# Patient Record
Sex: FEMALE | Race: WHITE | NOT HISPANIC OR LATINO | Employment: FULL TIME | URBAN - METROPOLITAN AREA
[De-identification: names, ages, dates, MRNs, and addresses within clinical notes are randomized per-mention and may not be internally consistent; named-entity substitution may affect disease eponyms.]

---

## 2019-01-21 ENCOUNTER — OFFICE VISIT (OUTPATIENT)
Dept: FAMILY MEDICINE CLINIC | Facility: CLINIC | Age: 69
End: 2019-01-21
Payer: COMMERCIAL

## 2019-01-21 VITALS
BODY MASS INDEX: 33.27 KG/M2 | HEIGHT: 61 IN | HEART RATE: 64 BPM | TEMPERATURE: 97.2 F | RESPIRATION RATE: 16 BRPM | SYSTOLIC BLOOD PRESSURE: 130 MMHG | DIASTOLIC BLOOD PRESSURE: 88 MMHG | WEIGHT: 176.2 LBS

## 2019-01-21 DIAGNOSIS — Z00.00 HEALTHCARE MAINTENANCE: ICD-10-CM

## 2019-01-21 DIAGNOSIS — R07.89 CHEST TIGHTNESS: ICD-10-CM

## 2019-01-21 DIAGNOSIS — R73.03 PREDIABETES: ICD-10-CM

## 2019-01-21 DIAGNOSIS — G89.29 CHRONIC BILATERAL THORACIC BACK PAIN: ICD-10-CM

## 2019-01-21 DIAGNOSIS — Z12.39 BREAST CANCER SCREENING: ICD-10-CM

## 2019-01-21 DIAGNOSIS — R06.02 SHORTNESS OF BREATH ON EXERTION: ICD-10-CM

## 2019-01-21 DIAGNOSIS — M54.6 CHRONIC BILATERAL THORACIC BACK PAIN: ICD-10-CM

## 2019-01-21 DIAGNOSIS — Z76.89 ENCOUNTER TO ESTABLISH CARE: Primary | ICD-10-CM

## 2019-01-21 DIAGNOSIS — Z13.220 LIPID SCREENING: ICD-10-CM

## 2019-01-21 DIAGNOSIS — R05.9 COUGH: ICD-10-CM

## 2019-01-21 LAB
SL AMB  POCT GLUCOSE, UA: NORMAL
SL AMB LEUKOCYTE ESTERASE,UA: NORMAL
SL AMB POCT BILIRUBIN,UA: NORMAL
SL AMB POCT BLOOD,UA: NORMAL
SL AMB POCT CLARITY,UA: CLEAR
SL AMB POCT COLOR,UA: YELLOW
SL AMB POCT KETONES,UA: NORMAL
SL AMB POCT NITRITE,UA: NORMAL
SL AMB POCT PH,UA: 7.5
SL AMB POCT SPECIFIC GRAVITY,UA: 1
SL AMB POCT URINE PROTEIN: NORMAL
SL AMB POCT UROBILINOGEN: 0.2

## 2019-01-21 PROCEDURE — 1160F RVW MEDS BY RX/DR IN RCRD: CPT | Performed by: NURSE PRACTITIONER

## 2019-01-21 PROCEDURE — 81002 URINALYSIS NONAUTO W/O SCOPE: CPT | Performed by: NURSE PRACTITIONER

## 2019-01-21 PROCEDURE — 3008F BODY MASS INDEX DOCD: CPT | Performed by: NURSE PRACTITIONER

## 2019-01-21 PROCEDURE — 1101F PT FALLS ASSESS-DOCD LE1/YR: CPT | Performed by: NURSE PRACTITIONER

## 2019-01-21 PROCEDURE — 99204 OFFICE O/P NEW MOD 45 MIN: CPT | Performed by: NURSE PRACTITIONER

## 2019-01-21 NOTE — PROGRESS NOTES
FAMILY PRACTICE OFFICE VISIT       NAME: Karla Ford  AGE: 80 y o  SEX: female       : 1950        MRN: [id number]    DATE: 2019    Assessment and Plan     Problem List Items Addressed This Visit     Prediabetes    Relevant Orders    Hemoglobin A1C      Other Visit Diagnoses     Encounter to establish care    -  Primary    Relevant Orders    POCT urine dip auto non-scope (Completed)    Shortness of breath on exertion        Relevant Orders    CBC and differential    Comprehensive metabolic panel    TSH, 3rd generation    Echo stress test w contrast if indicated    XR chest pa & lateral    Echo complete with contrast if indicated    Cough        Relevant Orders    CBC and differential    Comprehensive metabolic panel    TSH, 3rd generation    Echo stress test w contrast if indicated    XR chest pa & lateral    Chest tightness        Relevant Orders    CBC and differential    Comprehensive metabolic panel    TSH, 3rd generation    Echo stress test w contrast if indicated    XR chest pa & lateral    Chronic bilateral thoracic back pain        Lipid screening        Relevant Orders    Lipid panel    Breast cancer screening        Relevant Orders    Mammo screening bilateral w cad    Healthcare maintenance                1  Encounter to establish care  This 71-year-old female presents today with stables care  She has recently moved from [place]  She will request records from her prior PCP  In office urine dip is clear  POCT urine dip auto non-scope   2  Shortness of breath on exertion  Over the past 2 months has had shortness of breath with exertion, occasional cough, and chest tightness  Denies any other associated symptoms  No shortness of breath at rest  Symptoms began with a cold in which all other symptoms resolved  Exam is unremarkable  Etiology is unclear  Recommend blood work as noted, chest x-ray, echocardiogram, and echo stress testing   Office will call with result of testing when available, and further plan of care  If any worsening of symptoms, instructed to call or go to the emergency room  CBC and differential    Comprehensive metabolic panel    TSH, 3rd generation    Echo stress test w contrast if indicated    XR chest pa & lateral    Echo complete with contrast if indicated   3  Cough  CBC and differential    Comprehensive metabolic panel    TSH, 3rd generation    Echo stress test w contrast if indicated    XR chest pa & lateral   4  Chest tightness  CBC and differential    Comprehensive metabolic panel    TSH, 3rd generation    Echo stress test w contrast if indicated    XR chest pa & lateral   5  Chronic Bilateral Thoracic Back Pain Pain has been present since heavy lifting this past summer while volunteering at a [employer]  Pain is present at night, improves in the morning when she gets up and gets moving again  Suspect pain is muscular  Declines PT  Declines trial of muscle relaxer or NSAID  Declines chiropractor  Will trial heat and continue stretching  If symptoms continue to persist, she will call and we will consider imaging  6  Lipid screening  Lipid panel   7  Breast cancer screening  Mammo screening bilateral w cad   8  Prediabetes Hemoglobin A1C   9  Health Maintenance Per patient report:  Last mammogram 2017  Colonoscopy 2012 or 2013, needs repeat in 10 years  Last Dexa showed osteopenia, completed around 2014  Up to date on influenza vaccine, had pneumonia vaccines and zostavax  Will request records from prior PCP in [place], to get definitive dates and results of health maintenance activities  Chief Complaint     Chief Complaint   Patient presents with    Cold Like Symptoms     NP is here to est care, cough and SOB 1 + mos       History of Present Illness     Jazzy David is a 80year old female presenting today to establish care  She has recently moved from [place]   She is living with her sister locally, and working in [place] for the [employer]  Plans to work for 3 more years locally, then retire to [place], as she still has family there  Health has been overall good  Denies and significant past medical history  Past surgical history includes low back surgery, right hip replacement, cholecystectomy, and knee arthroscopic surgery for torn meniscus  BP elevated today, states she has never had hypertension  Typically, BP runs 120's/70's  In November she started with a chest cold, which seemed to resolve  But, since then she has short of breath with exertion and chest tightness  She has an occasional cough, cough has improved significantly over the past month  Shortness of breath has improved a little bit, but still bothersome  Never had asthma  Never smoked  No chest pain, dizziness, or palpitations  No paroxysmal nocturnal dyspnea, or orthopnea  She is having intermittent pain between shoulder blades for the past 4-5 months  She was doing a lot of heavy lifting volunteering for a [employer] over the summer  Pain has been present since then  Pain is present when lying on side or back  Sore after resting, and initiating movements, but improves with activity  No pain at rest  No numbness, tingling, or weakness in bilateral upper extremities  Health Maintenance:  Last mammogram 2017  Colonoscopy 2012 or 2013, needs repeat in 10 years  Last Dexa showed osteopenia, completed around 2014  Up to date on influenza vaccine, had pneumonia vaccines and zostavax  Review of Systems   Review of Systems   Constitutional: Negative for chills, diaphoresis, fatigue and fever  HENT: Negative  Eyes: Negative  Respiratory: Positive for cough and shortness of breath  Negative for wheezing  Cardiovascular: Negative for chest pain, palpitations and leg swelling  Gastrointestinal: Negative  Endocrine: Negative  Genitourinary: Negative      Musculoskeletal: Positive for back pain  Arthralgias: As noted in HPI  Skin: Negative  Allergic/Immunologic: Negative  Neurological: Negative  Hematological: Negative  Psychiatric/Behavioral: Negative  Active Problem List     Patient Active Problem List   Diagnosis    Osteopenia    Prediabetes       Past Medical History:  Past Medical History:   Diagnosis Date    Piriformis syndrome        Past Surgical History:  Past Surgical History:   Procedure Laterality Date    BACK SURGERY      CHOLECYSTECTOMY      KNEE SURGERY      torn meniscus    TOTAL HIP ARTHROPLASTY Right        Family History:  Family History   Problem Relation Age of Onset    Stroke Mother     COPD Mother     COPD Father     Diabetes Father     Asthma Brother     Factor V Leiden deficiency Other         neice and nephew       Social History:  Social History     Social History    Marital status:      Spouse name: N/A    Number of children: N/A    Years of education: N/A     Occupational History    Not on file  Social History Main Topics    Smoking status: Never Smoker    Smokeless tobacco: Never Used    Alcohol use 4 2 oz/week     7 Glasses of wine per week      Comment: drinks 4-6 ounces of wine daily    Drug use: No    Sexual activity: Not on file     Other Topics Concern    Not on file     Social History Narrative    No narrative on file       I have reviewed the patient's medical history in detail; there are no changes to the history as noted in the electronic medical record  Objective     Vitals:    01/21/19 1138 01/21/19 1243   BP: 150/90 130/88   Pulse: 64    Resp: 16    Temp: (!) 97 2 °F (36 2 °C)    TempSrc: Tympanic    Weight: 79 9 kg (176 lb 3 2 oz)    Height: 5' 0 91" (1 547 m)      Wt Readings from Last 3 Encounters:   01/21/19 79 9 kg (176 lb 3 2 oz)     Body mass index is 33 4 kg/m²    PHQ-9 Depression Screening    PHQ-9:    Frequency of the following problems over the past two weeks:       Little interest or pleasure in doing things:  0 - not at all  Feeling down, depressed, or hopeless:  0 - not at all  PHQ-2 Score:  0       Physical Exam   Constitutional: She is oriented to person, place, and time  She appears well-developed and well-nourished  No distress  HENT:   Head: Normocephalic and atraumatic  Right Ear: Tympanic membrane and ear canal normal    Left Ear: Tympanic membrane and ear canal normal    Nose: Nose normal    Mouth/Throat: Oropharynx is clear and moist    Eyes: Pupils are equal, round, and reactive to light  Conjunctivae and EOM are normal    Neck: Normal range of motion  Neck supple  No thyromegaly present  Negative for carotid bruits bilaterally  Cardiovascular: Normal rate and regular rhythm  No murmur heard  Pulmonary/Chest: Effort normal and breath sounds normal    Abdominal: Soft  Bowel sounds are normal    Musculoskeletal: Normal range of motion  Arms:  Lymphadenopathy:     She has no cervical adenopathy  Neurological: She is alert and oriented to person, place, and time  Skin: No rash noted  Psychiatric: She has a normal mood and affect  Nursing note and vitals reviewed  ALLERGIES:  No Known Allergies    Current Medications     No current outpatient prescriptions on file  No current facility-administered medications for this visit            Health Maintenance     Health Maintenance   Topic Date Due    DTaP,Tdap,and Td Vaccines (1 - Tdap) 07/17/1972    Pneumococcal PPSV23/PCV13 65+ Years / Low and Medium Risk (1 of 2 - PCV13) 07/17/2016    Hepatitis C Screening  04/18/2019 (Originally 1950)    CRC Screening: Colonoscopy  01/21/2023 (Originally 1950)    Fall Risk  01/21/2020    Depression Screening PHQ  01/21/2020    Urinary Incontinence Screening  01/21/2020    INFLUENZA VACCINE  Completed     Immunization History   Administered Date(s) Administered    Influenza 10/19/2018       BRISEYDA Toscano

## 2019-01-22 PROBLEM — M85.80 OSTEOPENIA: Status: ACTIVE | Noted: 2019-01-22

## 2019-01-22 PROBLEM — R73.03 PREDIABETES: Status: ACTIVE | Noted: 2019-01-22

## 2019-01-26 ENCOUNTER — APPOINTMENT (OUTPATIENT)
Dept: RADIOLOGY | Facility: CLINIC | Age: 69
End: 2019-01-26
Payer: COMMERCIAL

## 2019-01-26 DIAGNOSIS — R07.89 CHEST TIGHTNESS: ICD-10-CM

## 2019-01-26 DIAGNOSIS — R06.02 SHORTNESS OF BREATH ON EXERTION: ICD-10-CM

## 2019-01-26 DIAGNOSIS — R05.9 COUGH: ICD-10-CM

## 2019-01-26 PROCEDURE — 71046 X-RAY EXAM CHEST 2 VIEWS: CPT

## 2019-01-30 ENCOUNTER — TELEPHONE (OUTPATIENT)
Dept: FAMILY MEDICINE CLINIC | Facility: CLINIC | Age: 69
End: 2019-01-30

## 2019-01-30 ENCOUNTER — LAB (OUTPATIENT)
Dept: LAB | Facility: CLINIC | Age: 69
End: 2019-01-30
Payer: COMMERCIAL

## 2019-01-30 DIAGNOSIS — R05.9 COUGH: ICD-10-CM

## 2019-01-30 DIAGNOSIS — Z13.220 LIPID SCREENING: ICD-10-CM

## 2019-01-30 DIAGNOSIS — R06.02 SHORTNESS OF BREATH ON EXERTION: ICD-10-CM

## 2019-01-30 DIAGNOSIS — R79.89 ELEVATED TSH: Primary | ICD-10-CM

## 2019-01-30 DIAGNOSIS — R73.03 PREDIABETES: ICD-10-CM

## 2019-01-30 DIAGNOSIS — R07.89 CHEST TIGHTNESS: ICD-10-CM

## 2019-01-30 LAB
ALBUMIN SERPL BCP-MCNC: 3.4 G/DL (ref 3.5–5)
ALP SERPL-CCNC: 69 U/L (ref 46–116)
ALT SERPL W P-5'-P-CCNC: 14 U/L (ref 12–78)
ANION GAP SERPL CALCULATED.3IONS-SCNC: 5 MMOL/L (ref 4–13)
AST SERPL W P-5'-P-CCNC: 26 U/L (ref 5–45)
BASOPHILS # BLD AUTO: 0.05 THOUSANDS/ΜL (ref 0–0.1)
BASOPHILS NFR BLD AUTO: 1 % (ref 0–1)
BILIRUB SERPL-MCNC: 0.68 MG/DL (ref 0.2–1)
BUN SERPL-MCNC: 18 MG/DL (ref 5–25)
CALCIUM SERPL-MCNC: 8.6 MG/DL (ref 8.3–10.1)
CHLORIDE SERPL-SCNC: 106 MMOL/L (ref 100–108)
CHOLEST SERPL-MCNC: 203 MG/DL (ref 50–200)
CO2 SERPL-SCNC: 27 MMOL/L (ref 21–32)
CREAT SERPL-MCNC: 0.8 MG/DL (ref 0.6–1.3)
EOSINOPHIL # BLD AUTO: 0.13 THOUSAND/ΜL (ref 0–0.61)
EOSINOPHIL NFR BLD AUTO: 2 % (ref 0–6)
ERYTHROCYTE [DISTWIDTH] IN BLOOD BY AUTOMATED COUNT: 14.1 % (ref 11.6–15.1)
EST. AVERAGE GLUCOSE BLD GHB EST-MCNC: 126 MG/DL
GFR SERPL CREATININE-BSD FRML MDRD: 77 ML/MIN/1.73SQ M
GLUCOSE P FAST SERPL-MCNC: 98 MG/DL (ref 65–99)
HBA1C MFR BLD: 6 % (ref 4.2–6.3)
HCT VFR BLD AUTO: 41 % (ref 34.8–46.1)
HDLC SERPL-MCNC: 73 MG/DL (ref 40–60)
HGB BLD-MCNC: 12.8 G/DL (ref 11.5–15.4)
IMM GRANULOCYTES # BLD AUTO: 0.01 THOUSAND/UL (ref 0–0.2)
IMM GRANULOCYTES NFR BLD AUTO: 0 % (ref 0–2)
LDLC SERPL CALC-MCNC: 115 MG/DL (ref 0–100)
LYMPHOCYTES # BLD AUTO: 1.53 THOUSANDS/ΜL (ref 0.6–4.47)
LYMPHOCYTES NFR BLD AUTO: 27 % (ref 14–44)
MCH RBC QN AUTO: 28.5 PG (ref 26.8–34.3)
MCHC RBC AUTO-ENTMCNC: 31.2 G/DL (ref 31.4–37.4)
MCV RBC AUTO: 91 FL (ref 82–98)
MONOCYTES # BLD AUTO: 0.44 THOUSAND/ΜL (ref 0.17–1.22)
MONOCYTES NFR BLD AUTO: 8 % (ref 4–12)
NEUTROPHILS # BLD AUTO: 3.61 THOUSANDS/ΜL (ref 1.85–7.62)
NEUTS SEG NFR BLD AUTO: 62 % (ref 43–75)
NONHDLC SERPL-MCNC: 130 MG/DL
NRBC BLD AUTO-RTO: 0 /100 WBCS
PLATELET # BLD AUTO: 258 THOUSANDS/UL (ref 149–390)
PMV BLD AUTO: 10.5 FL (ref 8.9–12.7)
POTASSIUM SERPL-SCNC: 4.5 MMOL/L (ref 3.5–5.3)
PROT SERPL-MCNC: 7.1 G/DL (ref 6.4–8.2)
RBC # BLD AUTO: 4.49 MILLION/UL (ref 3.81–5.12)
SODIUM SERPL-SCNC: 138 MMOL/L (ref 136–145)
TRIGL SERPL-MCNC: 75 MG/DL
TSH SERPL DL<=0.05 MIU/L-ACNC: 6.51 UIU/ML (ref 0.36–3.74)
WBC # BLD AUTO: 5.77 THOUSAND/UL (ref 4.31–10.16)

## 2019-01-30 PROCEDURE — 36415 COLL VENOUS BLD VENIPUNCTURE: CPT

## 2019-01-30 PROCEDURE — 80053 COMPREHEN METABOLIC PANEL: CPT

## 2019-01-30 PROCEDURE — 85025 COMPLETE CBC W/AUTO DIFF WBC: CPT

## 2019-01-30 PROCEDURE — 80061 LIPID PANEL: CPT

## 2019-01-30 PROCEDURE — 83036 HEMOGLOBIN GLYCOSYLATED A1C: CPT

## 2019-01-30 PROCEDURE — 84443 ASSAY THYROID STIM HORMONE: CPT

## 2019-01-30 NOTE — PROGRESS NOTES
Please contact patient with blood work results  Liver, kidneys, electrolytes, blood sugar, and blood count are good  Cholesterol is mildly elevated, recommend eating low fat, low cholesterol diet  Consider a mediterranean diet  Thyroid function is a little bit underactive  Recommend repeating blood work for thyroid function in 1 month  Her x-ray results are still pending

## 2019-01-30 NOTE — TELEPHONE ENCOUNTER
----- Message from [id number] sent at 1/30/2019  1:02 PM EST -----  Please contact patient with blood work results  Liver, kidneys, electrolytes, blood sugar, and blood count are good  Cholesterol is mildly elevated, recommend eating low fat, low cholesterol diet  Consider a mediterranean diet  Thyroid function is a little bit underactive  Recommend repeating blood work for thyroid function in 1 month  Her x-ray results are still pending

## 2019-02-01 NOTE — PROGRESS NOTES
Please let patient know her chest x-ray is clear  No obvious reason for shortness of breath   Proceed with echocardiogram and stress test

## 2019-02-02 ENCOUNTER — TELEPHONE (OUTPATIENT)
Dept: FAMILY MEDICINE CLINIC | Facility: CLINIC | Age: 69
End: 2019-02-02

## 2019-02-02 NOTE — TELEPHONE ENCOUNTER
----- [id number] sent at 2/1/2019  4:31 PM EST -----  Please let patient know her chest x-ray is clear  No obvious reason for shortness of breath   Proceed with echocardiogram and stress test

## 2019-02-02 NOTE — TELEPHONE ENCOUNTER
----- [id number] at 2/1/2019  4:31 PM EST -----  Please let patient know her chest x-ray is clear  No obvious reason for shortness of breath   Proceed with echocardiogram and stress test

## 2019-03-01 ENCOUNTER — HOSPITAL ENCOUNTER (OUTPATIENT)
Dept: NON INVASIVE DIAGNOSTICS | Facility: HOSPITAL | Age: 69
Discharge: HOME | End: 2019-03-01
Payer: COMMERCIAL

## 2019-03-01 DIAGNOSIS — R06.02 SHORTNESS OF BREATH ON EXERTION: ICD-10-CM

## 2019-03-01 DIAGNOSIS — R07.89 CHEST TIGHTNESS: ICD-10-CM

## 2019-03-01 DIAGNOSIS — R05.9 COUGH: ICD-10-CM

## 2019-03-01 PROCEDURE — 93350 STRESS TTE ONLY: CPT

## 2019-03-01 PROCEDURE — 93306 TTE W/DOPPLER COMPLETE: CPT | Performed by: INTERNAL MEDICINE

## 2019-03-01 PROCEDURE — 93306 TTE W/DOPPLER COMPLETE: CPT

## 2019-03-01 PROCEDURE — 93351 STRESS TTE COMPLETE: CPT | Performed by: INTERNAL MEDICINE

## 2019-03-04 LAB
CHEST PAIN STATEMENT: NORMAL
MAX DIASTOLIC BP: 80 MMHG
MAX HEART RATE: 157 BPM
MAX PREDICTED HEART RATE: 153 BPM
MAX. SYSTOLIC BP: 170 MMHG
PROTOCOL NAME: NORMAL
TARGET HR FORMULA: NORMAL
TEST INDICATION: NORMAL
TIME IN EXERCISE PHASE: NORMAL

## 2019-03-05 ENCOUNTER — TELEPHONE (OUTPATIENT)
Dept: FAMILY MEDICINE CLINIC | Facility: CLINIC | Age: 69
End: 2019-03-05

## 2019-03-05 DIAGNOSIS — R06.02 SHORTNESS OF BREATH ON EXERTION: Primary | ICD-10-CM

## 2019-03-05 NOTE — TELEPHONE ENCOUNTER
Please contact patient with results of testing    Stress test is negative  There is no sign of decreased oxygen to the heart with exercise  Echocardiogram was good  Your heart is a little bit stiff when it relaxes and fills  This is common with aging and should not cause shortness of breath with exertion  I would like her to do a complete pulmonary function study to check her lung function and repeat her thyroid blood work  After she completes this testing please have her follow up in office

## 2020-08-17 ENCOUNTER — TELEMEDICINE (OUTPATIENT)
Dept: FAMILY MEDICINE CLINIC | Facility: CLINIC | Age: 70
End: 2020-08-17
Payer: COMMERCIAL

## 2020-08-17 VITALS — BODY MASS INDEX: 30.91 KG/M2 | HEIGHT: 62 IN | WEIGHT: 168 LBS

## 2020-08-17 DIAGNOSIS — L29.9 SKIN PRURITUS: ICD-10-CM

## 2020-08-17 DIAGNOSIS — R21 SKIN RASH: Primary | ICD-10-CM

## 2020-08-17 PROCEDURE — 1160F RVW MEDS BY RX/DR IN RCRD: CPT | Performed by: FAMILY MEDICINE

## 2020-08-17 PROCEDURE — 3725F SCREEN DEPRESSION PERFORMED: CPT | Performed by: FAMILY MEDICINE

## 2020-08-17 PROCEDURE — 1036F TOBACCO NON-USER: CPT | Performed by: FAMILY MEDICINE

## 2020-08-17 PROCEDURE — 3008F BODY MASS INDEX DOCD: CPT | Performed by: FAMILY MEDICINE

## 2020-08-17 PROCEDURE — 3288F FALL RISK ASSESSMENT DOCD: CPT | Performed by: FAMILY MEDICINE

## 2020-08-17 PROCEDURE — 99213 OFFICE O/P EST LOW 20 MIN: CPT | Performed by: FAMILY MEDICINE

## 2020-08-17 PROCEDURE — 1101F PT FALLS ASSESS-DOCD LE1/YR: CPT | Performed by: FAMILY MEDICINE

## 2020-08-17 RX ORDER — CLOBETASOL PROPIONATE 0.5 MG/G
CREAM TOPICAL 2 TIMES DAILY
Qty: 60 G | Refills: 1 | Status: SHIPPED | OUTPATIENT
Start: 2020-08-17 | End: 2021-10-14

## 2020-08-17 NOTE — PROGRESS NOTES
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 6  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, cutting, trimming  She tries to cover her arms with long sleeves  Patient states in between, did improve however more recently because she has been on the field in the heat, the rash has persisted  She has tried aloe vera plant, calamine lotion, OTC hydrocortisone 1% cream   Denies fevers, worsening redness, red streaks or any acute signs of infection  I will start patient on topical clobetasol cream   She may use Benadryl as well  If patient should develop signs of infection, fever or anything else that is concerning, I would like her to let me know  She is agreeable with this plan  Reason for visit is for evaluation skin rash over left arm with associated itching  Chief Complaint   Patient presents with    poision ivy     L arm x 1 week  itchy , unable to get vital signs     Virtual Regular Visit        Encounter provider Long Echevarria MD    Provider located at [address] [place] [id number]      Recent Visits  No visits were found meeting these conditions     Showing recent visits within past 7 days and meeting all other requirements     Today's Visits  Date Type Provider Dept   08/17/20 Telemedicine Long Echevarria MD [address] [place] [address] today's visits and meeting all other requirements     Future Appointments  No visits were found meeting these conditions  Showing future appointments within next 150 days and meeting all other requirements        The patient was identified by name and date of birth  Karla Ford was informed that this is a telemedicine visit and that the visit is being conducted through [employer] and patient was informed that this is not a secure, HIPAA-complaint platform  She agrees to proceed     My office door was closed  No one else was in the room  She acknowledged consent and understanding of privacy and security of the video platform  The patient has agreed to participate and understands they can discontinue the visit at any time  Patient is aware this is a billable service  Subjective  Karla Ford is a 72 y o  female presents today via virtual video telemedicine visit for evaluation of skin rash with associated itching secondary to exposure to poison ivy over her left forearm and left elbow since mid July  Patient states she works outside for the [address] [place] [address] with invasive in 6  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, cutting, trimming  She tries to cover her arms with long sleeves  Patient states in between, did improve however more recently because she has been on the field in the heat, the rash has persisted  She has tried aloe vera plant, calamine lotion, OTC hydrocortisone 1% cream   Denies fevers, worsening redness, red streaks or any acute signs of infection      HPI     Past Medical History:   Diagnosis Date    Anemia     Factor V Leiden carrier ([facility] [address] )     Hypovitaminosis D     Piriformis syndrome     Piriformis syndrome of left side        Past Surgical History:   Procedure Laterality Date    BACK SURGERY      L5-S1 ruptured disc    CHOLECYSTECTOMY      KNEE SURGERY      torn meniscus    TOTAL HIP ARTHROPLASTY Right        Current Outpatient Medications Medication Sig Dispense Refill    clobetasol (TEMOVATE) 0 05 % cream Apply topically 2 (two) times a day 60 g 1     No current facility-administered medications for this visit  Allergies   Allergen Reactions    Hydroxyzine Hives    Tizanidine      Feels very strange       Review of Systems   Constitutional: Negative for fever  Skin: Positive for rash  Video Exam    Vitals:    08/17/20 1446   Weight: 76 2 kg (168 lb)   Height: 5' 2" (1 575 m)       Physical Exam  Nursing note reviewed  Constitutional:       General: She is not in acute distress  Appearance: Normal appearance  Pulmonary:      Effort: No respiratory distress  Skin:     Findings: Erythema present  Comments: Macular papular lesions noted over left posterior anterior forearm as well as erythema noted over left elbow  Neurological:      Mental Status: She is alert and oriented to person, place, and time  VIRTUAL VISIT DISCLAIMER    Rafael Almeida acknowledges that she has consented to an online visit or consultation  She understands that the online visit is based solely on information provided by her, and that, in the absence of a face-to-face physical evaluation by the physician, the diagnosis she receives is both limited and provisional in terms of accuracy and completeness  This is not intended to replace a full medical face-to-face evaluation by the physician  Rafael Almeida understands and accepts these terms

## 2020-12-29 ENCOUNTER — VBI (OUTPATIENT)
Dept: ADMINISTRATIVE | Facility: OTHER | Age: 70
End: 2020-12-29

## 2021-04-12 ENCOUNTER — TELEPHONE (OUTPATIENT)
Dept: FAMILY MEDICINE CLINIC | Facility: CLINIC | Age: 71
End: 2021-04-12

## 2021-07-14 ENCOUNTER — VBI (OUTPATIENT)
Dept: ADMINISTRATIVE | Facility: OTHER | Age: 71
End: 2021-07-14

## 2021-10-13 ENCOUNTER — HOSPITAL ENCOUNTER (OUTPATIENT)
Dept: MAMMOGRAPHY | Facility: IMAGING CENTER | Age: 71
Discharge: HOME | End: 2021-10-13
Payer: COMMERCIAL

## 2021-10-13 VITALS — HEIGHT: 62 IN | BODY MASS INDEX: 30.91 KG/M2 | WEIGHT: 168 LBS

## 2021-10-13 DIAGNOSIS — Z12.31 ENCOUNTER FOR SCREENING MAMMOGRAM FOR MALIGNANT NEOPLASM OF BREAST: ICD-10-CM

## 2021-10-13 PROCEDURE — 77063 BREAST TOMOSYNTHESIS BI: CPT

## 2021-10-13 PROCEDURE — 77067 SCR MAMMO BI INCL CAD: CPT

## 2021-10-14 ENCOUNTER — OFFICE VISIT (OUTPATIENT)
Dept: FAMILY MEDICINE CLINIC | Facility: CLINIC | Age: 71
End: 2021-10-14
Payer: COMMERCIAL

## 2021-10-14 ENCOUNTER — LAB (OUTPATIENT)
Dept: LAB | Facility: CLINIC | Age: 71
End: 2021-10-14
Payer: COMMERCIAL

## 2021-10-14 VITALS
WEIGHT: 167 LBS | OXYGEN SATURATION: 95 % | RESPIRATION RATE: 18 BRPM | BODY MASS INDEX: 30.73 KG/M2 | TEMPERATURE: 97.8 F | HEART RATE: 86 BPM | HEIGHT: 62 IN | SYSTOLIC BLOOD PRESSURE: 138 MMHG | DIASTOLIC BLOOD PRESSURE: 82 MMHG

## 2021-10-14 DIAGNOSIS — M47.812 SPONDYLOSIS OF CERVICAL REGION WITHOUT MYELOPATHY OR RADICULOPATHY: Primary | ICD-10-CM

## 2021-10-14 DIAGNOSIS — E03.9 ACQUIRED HYPOTHYROIDISM: ICD-10-CM

## 2021-10-14 DIAGNOSIS — R73.03 PREDIABETES: ICD-10-CM

## 2021-10-14 DIAGNOSIS — Z11.59 NEED FOR HEPATITIS C SCREENING TEST: ICD-10-CM

## 2021-10-14 DIAGNOSIS — Z23 NEED FOR INFLUENZA VACCINATION: ICD-10-CM

## 2021-10-14 PROBLEM — L29.9 SKIN PRURITUS: Status: RESOLVED | Noted: 2020-08-17 | Resolved: 2021-10-14

## 2021-10-14 PROBLEM — R21 SKIN RASH: Status: RESOLVED | Noted: 2020-08-17 | Resolved: 2021-10-14

## 2021-10-14 LAB
EST. AVERAGE GLUCOSE BLD GHB EST-MCNC: 120 MG/DL
HBA1C MFR BLD: 5.8 %
HCV AB SER QL: NORMAL
T4 FREE SERPL-MCNC: 0.97 NG/DL (ref 0.76–1.46)
TSH SERPL DL<=0.05 MIU/L-ACNC: 4.24 UIU/ML (ref 0.36–3.74)

## 2021-10-14 PROCEDURE — 90471 IMMUNIZATION ADMIN: CPT

## 2021-10-14 PROCEDURE — 3725F SCREEN DEPRESSION PERFORMED: CPT | Performed by: FAMILY MEDICINE

## 2021-10-14 PROCEDURE — 99214 OFFICE O/P EST MOD 30 MIN: CPT | Performed by: FAMILY MEDICINE

## 2021-10-14 PROCEDURE — 1036F TOBACCO NON-USER: CPT | Performed by: FAMILY MEDICINE

## 2021-10-14 PROCEDURE — 1160F RVW MEDS BY RX/DR IN RCRD: CPT | Performed by: FAMILY MEDICINE

## 2021-10-14 PROCEDURE — 84443 ASSAY THYROID STIM HORMONE: CPT

## 2021-10-14 PROCEDURE — 83036 HEMOGLOBIN GLYCOSYLATED A1C: CPT

## 2021-10-14 PROCEDURE — 84439 ASSAY OF FREE THYROXINE: CPT

## 2021-10-14 PROCEDURE — 86803 HEPATITIS C AB TEST: CPT

## 2021-10-14 PROCEDURE — 90662 IIV NO PRSV INCREASED AG IM: CPT

## 2021-10-14 PROCEDURE — 3008F BODY MASS INDEX DOCD: CPT | Performed by: FAMILY MEDICINE

## 2021-10-14 PROCEDURE — 36415 COLL VENOUS BLD VENIPUNCTURE: CPT

## 2021-10-18 ENCOUNTER — APPOINTMENT (OUTPATIENT)
Dept: RADIOLOGY | Facility: CLINIC | Age: 71
End: 2021-10-18
Payer: COMMERCIAL

## 2021-10-18 DIAGNOSIS — M47.812 SPONDYLOSIS OF CERVICAL REGION WITHOUT MYELOPATHY OR RADICULOPATHY: ICD-10-CM

## 2021-10-18 PROCEDURE — 72040 X-RAY EXAM NECK SPINE 2-3 VW: CPT

## 2021-10-20 ENCOUNTER — VBI (OUTPATIENT)
Dept: ADMINISTRATIVE | Facility: OTHER | Age: 71
End: 2021-10-20

## 2021-11-15 ENCOUNTER — OFFICE VISIT (OUTPATIENT)
Dept: FAMILY MEDICINE CLINIC | Facility: CLINIC | Age: 71
End: 2021-11-15
Payer: COMMERCIAL

## 2021-11-15 VITALS
BODY MASS INDEX: 30.95 KG/M2 | WEIGHT: 168.2 LBS | DIASTOLIC BLOOD PRESSURE: 86 MMHG | HEART RATE: 54 BPM | HEIGHT: 62 IN | TEMPERATURE: 97.8 F | OXYGEN SATURATION: 97 % | SYSTOLIC BLOOD PRESSURE: 146 MMHG | RESPIRATION RATE: 16 BRPM

## 2021-11-15 DIAGNOSIS — M54.2 CERVICALGIA: Primary | ICD-10-CM

## 2021-11-15 DIAGNOSIS — R79.89 ELEVATED TSH: ICD-10-CM

## 2021-11-15 DIAGNOSIS — R03.0 ELEVATED BLOOD PRESSURE READING: ICD-10-CM

## 2021-11-15 DIAGNOSIS — R73.03 PREDIABETES: ICD-10-CM

## 2021-11-15 PROCEDURE — 99214 OFFICE O/P EST MOD 30 MIN: CPT | Performed by: NURSE PRACTITIONER

## 2021-11-15 PROCEDURE — 1160F RVW MEDS BY RX/DR IN RCRD: CPT | Performed by: NURSE PRACTITIONER

## 2021-11-30 ENCOUNTER — OFFICE VISIT (OUTPATIENT)
Dept: FAMILY MEDICINE CLINIC | Facility: CLINIC | Age: 71
End: 2021-11-30
Payer: COMMERCIAL

## 2021-11-30 VITALS
BODY MASS INDEX: 30.36 KG/M2 | WEIGHT: 165 LBS | HEART RATE: 68 BPM | OXYGEN SATURATION: 97 % | DIASTOLIC BLOOD PRESSURE: 62 MMHG | TEMPERATURE: 97.5 F | HEIGHT: 62 IN | SYSTOLIC BLOOD PRESSURE: 110 MMHG | RESPIRATION RATE: 16 BRPM

## 2021-11-30 DIAGNOSIS — Z01.30 BLOOD PRESSURE CHECK: Primary | ICD-10-CM

## 2021-11-30 DIAGNOSIS — M47.812 SPONDYLOSIS OF CERVICAL REGION WITHOUT MYELOPATHY OR RADICULOPATHY: ICD-10-CM

## 2021-11-30 DIAGNOSIS — E03.9 ACQUIRED HYPOTHYROIDISM: ICD-10-CM

## 2021-11-30 DIAGNOSIS — Z13.220 LIPID SCREENING: ICD-10-CM

## 2021-11-30 DIAGNOSIS — E66.09 CLASS 1 OBESITY DUE TO EXCESS CALORIES WITHOUT SERIOUS COMORBIDITY WITH BODY MASS INDEX (BMI) OF 30.0 TO 30.9 IN ADULT: ICD-10-CM

## 2021-11-30 PROCEDURE — 99213 OFFICE O/P EST LOW 20 MIN: CPT | Performed by: NURSE PRACTITIONER

## 2021-11-30 PROCEDURE — 1160F RVW MEDS BY RX/DR IN RCRD: CPT | Performed by: NURSE PRACTITIONER

## 2021-11-30 PROCEDURE — 3008F BODY MASS INDEX DOCD: CPT | Performed by: NURSE PRACTITIONER

## 2021-11-30 PROCEDURE — 1036F TOBACCO NON-USER: CPT | Performed by: NURSE PRACTITIONER

## 2021-12-07 ENCOUNTER — EVALUATION (OUTPATIENT)
Dept: PHYSICAL THERAPY | Facility: CLINIC | Age: 71
End: 2021-12-07
Payer: COMMERCIAL

## 2021-12-07 DIAGNOSIS — M54.2 CERVICALGIA: ICD-10-CM

## 2021-12-07 PROCEDURE — 97112 NEUROMUSCULAR REEDUCATION: CPT | Performed by: PHYSICAL THERAPIST

## 2021-12-07 PROCEDURE — 97161 PT EVAL LOW COMPLEX 20 MIN: CPT | Performed by: PHYSICAL THERAPIST

## 2021-12-09 ENCOUNTER — OFFICE VISIT (OUTPATIENT)
Dept: PHYSICAL THERAPY | Facility: CLINIC | Age: 71
End: 2021-12-09
Payer: COMMERCIAL

## 2021-12-09 DIAGNOSIS — M54.2 CERVICALGIA: Primary | ICD-10-CM

## 2021-12-09 PROCEDURE — 97140 MANUAL THERAPY 1/> REGIONS: CPT | Performed by: PHYSICAL THERAPIST

## 2021-12-15 ENCOUNTER — OFFICE VISIT (OUTPATIENT)
Dept: PHYSICAL THERAPY | Facility: CLINIC | Age: 71
End: 2021-12-15
Payer: COMMERCIAL

## 2021-12-15 DIAGNOSIS — M54.2 CERVICALGIA: Primary | ICD-10-CM

## 2021-12-15 PROCEDURE — 97140 MANUAL THERAPY 1/> REGIONS: CPT | Performed by: PHYSICAL THERAPIST

## 2021-12-15 PROCEDURE — 97112 NEUROMUSCULAR REEDUCATION: CPT | Performed by: PHYSICAL THERAPIST

## 2021-12-20 ENCOUNTER — OFFICE VISIT (OUTPATIENT)
Dept: PHYSICAL THERAPY | Facility: CLINIC | Age: 71
End: 2021-12-20
Payer: COMMERCIAL

## 2021-12-20 DIAGNOSIS — M54.2 CERVICALGIA: Primary | ICD-10-CM

## 2021-12-20 PROCEDURE — 97140 MANUAL THERAPY 1/> REGIONS: CPT | Performed by: PHYSICAL THERAPIST

## 2021-12-20 PROCEDURE — 97112 NEUROMUSCULAR REEDUCATION: CPT | Performed by: PHYSICAL THERAPIST

## 2021-12-21 ENCOUNTER — VBI (OUTPATIENT)
Dept: ADMINISTRATIVE | Facility: OTHER | Age: 71
End: 2021-12-21

## 2021-12-22 ENCOUNTER — OFFICE VISIT (OUTPATIENT)
Dept: PHYSICAL THERAPY | Facility: CLINIC | Age: 71
End: 2021-12-22
Payer: COMMERCIAL

## 2021-12-22 DIAGNOSIS — M54.2 CERVICALGIA: Primary | ICD-10-CM

## 2021-12-22 PROCEDURE — 97112 NEUROMUSCULAR REEDUCATION: CPT | Performed by: PHYSICAL THERAPIST

## 2021-12-22 PROCEDURE — 97140 MANUAL THERAPY 1/> REGIONS: CPT | Performed by: PHYSICAL THERAPIST

## 2022-01-07 ENCOUNTER — OFFICE VISIT (OUTPATIENT)
Dept: PHYSICAL THERAPY | Facility: CLINIC | Age: 72
End: 2022-01-07
Payer: COMMERCIAL

## 2022-01-07 DIAGNOSIS — M54.2 CERVICALGIA: Primary | ICD-10-CM

## 2022-01-07 PROCEDURE — 97112 NEUROMUSCULAR REEDUCATION: CPT | Performed by: PHYSICAL THERAPIST

## 2022-01-07 PROCEDURE — 97140 MANUAL THERAPY 1/> REGIONS: CPT | Performed by: PHYSICAL THERAPIST

## 2022-01-07 NOTE — PROGRESS NOTES
Daily Note     Today's date: 2022  Patient name: Marleni Sethi  : 1950  MRN: [id number]  Referring provider: BRISEYDA Sauer  Dx:   Encounter Diagnosis     ICD-10-CM    1  Cervicalgia  M54 2                   Subjective: Pt reports she's been feeling well and keeping up with her exercises  Objective: See treatment diary below      Assessment: Tolerated treatment well  Patient exhibited good technique with therapeutic exercises and would benefit from continued PT  Decreased TTP noted with manual treatment today  Plan: Continue per plan of care        Precautions: none reported  [facility]    Manuals 12/7 12/9 12/15 12/20 12/22 1/7       C/S traction + retraction + ext  25% 15x 25% 30"x4 25% 15x 25% 30"x4 50% 15x 50% 30"x4 75% 15x 75% 30"x3 75% 15x 75% 10"x5                    Max simon laser L parasp  4' 4' 4' 4' 4'       SOR      KT       Neuro Re-Ed             DNF w/ biofeedback   10"x5 20-25mmHg 10"x10 20-30mmHg 10"x10 20-30mmHg 10"x10 20-30mmHg       Posture tband grn HEP  15x ea grn 20x ea grn 20x ea grn 25x ea grn       C/S retraction HEP     rev       C/S and T/S ext over chair    10"x10 10"x10 10"x10       Prone YT                                       Ther Ex             Doorway pec str    20"x3 20"x3 30"x3                                                                                                  Ther Activity             Bwd UBE      6 min                    Gait Training                                       Modalities             HP supine  7min 7 min 7 min 7 min

## 2022-01-11 ENCOUNTER — OFFICE VISIT (OUTPATIENT)
Dept: PHYSICAL THERAPY | Facility: CLINIC | Age: 72
End: 2022-01-11
Payer: COMMERCIAL

## 2022-01-11 DIAGNOSIS — M54.2 CERVICALGIA: Primary | ICD-10-CM

## 2022-01-11 PROCEDURE — 97112 NEUROMUSCULAR REEDUCATION: CPT | Performed by: PHYSICAL THERAPIST

## 2022-01-11 PROCEDURE — 97140 MANUAL THERAPY 1/> REGIONS: CPT | Performed by: PHYSICAL THERAPIST

## 2022-01-11 NOTE — PROGRESS NOTES
Daily Note     Today's date: 2022  Patient name: Beena Montemayor  : 1950  MRN: [id number]  Referring provider: BRISEYDA Gonzalez  Dx:   Encounter Diagnosis     ICD-10-CM    1  Cervicalgia  M54 2                   Subjective: Pt reports she continues to feel an improvement  She's no longer having daily pain  Objective: See treatment diary below      Assessment: Tolerated treatment well  Patient exhibited good technique with therapeutic exercises and would benefit from continued PT      Plan: Continue per plan of care        Precautions: none reported  [facility] JOHN    Manuals 12/7 12/9 12/15 12/20 12/22 1/7 1/11      C/S traction + retraction + ext  25% 15x 25% 30"x4 25% 15x 25% 30"x4 50% 15x 50% 30"x4 75% 15x 75% 30"x3 75% 15x 75% 10"x5 100% 15x 75% 10"x5                   Max simon laser L parasp  4' 4' 4' 4' 4' 2 min 13W      SOR      KT KT      Neuro Re-Ed             DNF w/ biofeedback   10"x5 20-25mmHg 10"x10 20-30mmHg 10"x10 20-30mmHg 10"x10 20-30mmHg 10"x10 20-30mmHg      Posture tband grn HEP  15x ea grn 20x ea grn 20x ea grn 25x ea grn 30x ea grn      C/S retraction HEP     rev       C/S and T/S ext over chair    10"x10 10"x10 10"x10 10"x10      Prone YT                                       Ther Ex             Doorway pec str    20"x3 20"x3 30"x3 30"x3                                                                                                 Ther Activity             Bwd UBE      6 min 8 min                   Gait Training                                       Modalities             HP supine  7min 7 min 7 min 7 min

## 2022-01-14 ENCOUNTER — APPOINTMENT (OUTPATIENT)
Dept: PHYSICAL THERAPY | Facility: CLINIC | Age: 72
End: 2022-01-14
Payer: COMMERCIAL

## 2022-01-17 ENCOUNTER — OFFICE VISIT (OUTPATIENT)
Dept: PHYSICAL THERAPY | Facility: CLINIC | Age: 72
End: 2022-01-17
Payer: COMMERCIAL

## 2022-01-17 DIAGNOSIS — M54.2 CERVICALGIA: Primary | ICD-10-CM

## 2022-01-17 PROCEDURE — 97112 NEUROMUSCULAR REEDUCATION: CPT | Performed by: PHYSICAL THERAPIST

## 2022-01-17 PROCEDURE — 97140 MANUAL THERAPY 1/> REGIONS: CPT | Performed by: PHYSICAL THERAPIST

## 2022-01-17 NOTE — PROGRESS NOTES
Daily Note     Today's date: 2022  Patient name: Nael Xiong  : 1950  MRN: [id number]  Referring provider: BRISEYDA Engle  Dx:   Encounter Diagnosis     ICD-10-CM    1  Cervicalgia  M54 2                   Subjective: Pt reports she's more sore today after having a very physical week at work  Objective: See treatment diary below      Assessment: Tolerated treatment well  Patient with decreased pain after manual treatments and hot pack  Increased tone noted at right suboccipitals today  Plan: Continue per plan of care        Precautions: none reported  [facility]    Manuals 12/7 12/9 12/15 12/20 12/22 1/7 1/11 1/17     C/S traction + retraction + ext  25% 15x 25% 30"x4 25% 15x 25% 30"x4 50% 15x 50% 30"x4 75% 15x 75% 30"x3 75% 15x 75% 10"x5 100% 15x 75% 10"x5 100% 15x 75% 10"x5                  Max simon laser L parasp  4' 4' 4' 4' 4' 2 min 13W 2 min 13W     SOR      KT KT KT     Neuro Re-Ed             DNF w/ biofeedback   10"x5 20-25mmHg 10"x10 20-30mmHg 10"x10 20-30mmHg 10"x10 20-30mmHg 10"x10 20-30mmHg 10"x10 20-30mmHg     Posture tband grn HEP  15x ea grn 20x ea grn 20x ea grn 25x ea grn 30x ea grn 30x ea grn     C/S retraction HEP     rev       C/S and T/S ext over chair    10"x10 10"x10 10"x10 10"x10 10"x10     Prone YT                                       Ther Ex             Doorway pec str    20"x3 20"x3 30"x3 30"x3 30"x3                                                                                                Ther Activity             Bwd UBE      6 min 8 min 8 min                  Gait Training                                       Modalities             HP supine  7min 7 min 7 min 7 min   7 min

## 2022-01-26 ENCOUNTER — OFFICE VISIT (OUTPATIENT)
Dept: PHYSICAL THERAPY | Facility: CLINIC | Age: 72
End: 2022-01-26
Payer: COMMERCIAL

## 2022-01-26 DIAGNOSIS — M54.2 CERVICALGIA: Primary | ICD-10-CM

## 2022-01-26 PROCEDURE — 97140 MANUAL THERAPY 1/> REGIONS: CPT | Performed by: PHYSICAL THERAPIST

## 2022-01-26 PROCEDURE — 97112 NEUROMUSCULAR REEDUCATION: CPT | Performed by: PHYSICAL THERAPIST

## 2022-01-26 NOTE — PROGRESS NOTES
Daily Note     Today's date: 2022  Patient name: Nathaly Carrera  : 1950  MRN: [id number]  Referring provider: BRISEYDA Razo  Dx:   Encounter Diagnosis     ICD-10-CM    1  Cervicalgia  M54 2                   Subjective: Pt reports she's more sore this morning after doing a lot of driving and field work over the last couple of days  Objective: See treatment diary below      Assessment: Tolerated treatment well  Patient with excellent ROM and muscle tone during manual treatments today  Encouraged her to use hot pack at home when she's feeling stiff  Plan: Progress note during next visit        Precautions: none reported  [facility] JOHN    Manuals 12/7 12/9 12/15 12/20 12/22 1/7 1/11 1/17 1/26    C/S traction + retraction + ext  25% 15x 25% 30"x4 25% 15x 25% 30"x4 50% 15x 50% 30"x4 75% 15x 75% 30"x3 75% 15x 75% 10"x5 100% 15x 75% 10"x5 100% 15x 75% 10"x5 100% 15x 100% 10"x5                 Max simon laser L parasp  4' 4' 4' 4' 4' 2 min 13W 2 min 13W 2 min 13W    SOR      KT KT KT KT    Neuro Re-Ed             DNF w/ biofeedback   10"x5 20-25mmHg 10"x10 20-30mmHg 10"x10 20-30mmHg 10"x10 20-30mmHg 10"x10 20-30mmHg 10"x10 20-30mmHg 10"x10 20-30mmHg    Posture tband grn HEP  15x ea grn 20x ea grn 20x ea grn 25x ea grn 30x ea grn 30x ea grn 30x ea grn    C/S retraction HEP     rev       C/S and T/S ext over chair    10"x10 10"x10 10"x10 10"x10 10"x10     Prone YT                                       Ther Ex             Doorway pec str    20"x3 20"x3 30"x3 30"x3 30"x3                                                                                                Ther Activity             Bwd UBE      6 min 8 min 8 min 8 min                 Gait Training                                       Modalities             HP supine  7min 7 min 7 min 7 min   7 min 7 min

## 2022-02-04 ENCOUNTER — EVALUATION (OUTPATIENT)
Dept: PHYSICAL THERAPY | Facility: CLINIC | Age: 72
End: 2022-02-04
Payer: COMMERCIAL

## 2022-02-04 DIAGNOSIS — M54.2 CERVICALGIA: Primary | ICD-10-CM

## 2022-02-04 PROCEDURE — 97112 NEUROMUSCULAR REEDUCATION: CPT | Performed by: PHYSICAL THERAPIST

## 2022-02-04 PROCEDURE — 97140 MANUAL THERAPY 1/> REGIONS: CPT | Performed by: PHYSICAL THERAPIST

## 2022-02-04 NOTE — PROGRESS NOTES
Reassessment    Today's date: 2022  Patient name: Crissy Mujica  : 1950  MRN: [id number]  Referring provider: Mendel Alert, CRNP  Dx:   Encounter Diagnosis     ICD-10-CM    1  Cervicalgia  M54 2                       Assessment  Assessment details: Crissy Mujica demonstrates steady progress with ROM, strength, and function since beginning PT  Her pain fluctuates depending on her activity level  She remains a good candidate for outpatient PT to address continued limitations in above areas  Functional limitations: some discomfort turning her head    Goals  6 weeks  1  Independent with HEP at discharge - ongoing  2  Improve postural awareness to decrease forward head posture during daily tasks - MET  3  Increase strength of scapular stabilizers by 1 MMT grade to improve posture  - progressing slowly  4  Tolerate sleeping 6 hours without pain to improve quality of life  - MET  5  Improve ROM of C/S retraction and extension by 25% to improve posture and body mechanics  - progressing slowly      Plan  Continue PT 1-2x/week x 4 weeks with therapeutic exercises, neuromuscular re-education, manual therapy      Subjective Evaluation  Pt reports PT had been helping her neck prior to flaring it up a few days ago when she tried to do some exercises for her low back  She would like to go back to her doctor to see if anything else is going on  She has relief after PT sessions and thinks the manual treatments are helpful      Pain  Current pain ratin  At best pain ratin  At worst pain ratin  Location: neck    Social Support    Employment status: working (works for [employer] with [employer])  Exercise history: walking      Diagnostic Tests  X-ray: normal  Treatments  Previous treatment: medication  Patient Goals  Patient goals for therapy: decreased pain and increased motion          Objective     Active Range of Motion   Cervical/Thoracic Spine       Cervical    Subcranial retraction: Restriction level: moderate  Extension: Neck active extension: min lower C/S motion       Restriction level: minimal  Left lateral flexion: 25 degrees      Right lateral flexion: 30 degrees      Left rotation: 65 degrees  Right rotation: 60 degrees             Joint Play     Comments: Grossly restricted lower C/S and throughout T/S  Mechanical Assessment    Cervical    Seated retraction: repeated movements   Pain location: no change  Seated Extension: repeated movements  Pain location: no change        Strength/Myotome Testing     Left Shoulder     Planes of Motion   Flexion: 5   Abduction: 5   External rotation at 0°: 5   Internal rotation at 0°: 5     Isolated Muscles   Middle trapezius: 4     Right Shoulder     Planes of Motion   Flexion: 5   Abduction: 5   External rotation at 0°: 5   Internal rotation at 0°: 5     Isolated Muscles   Lower trapezius: 3+   Middle trapezius: 4       Precautions: none reported  [facility]    Manuals 12/7 12/9 12/15 12/20 12/22 1/7 1/11 1/17 1/26 2/4   C/S traction + retraction + ext  25% 15x 25% 30"x4 25% 15x 25% 30"x4 50% 15x 50% 30"x4 75% 15x 75% 30"x3 75% 15x 75% 10"x5 100% 15x 75% 10"x5 100% 15x 75% 10"x5 100% 15x 100% 10"x5 15x 100%                 Max simon laser L parasp  4' 4' 4' 4' 4' 2 min 13W 2 min 13W 2 min 13W 3 min 13W   SOR      KT KT KT KT KT   Neuro Re-Ed             DNF w/ biofeedback   10"x5 20-25mmHg 10"x10 20-30mmHg 10"x10 20-30mmHg 10"x10 20-30mmHg 10"x10 20-30mmHg 10"x10 20-30mmHg 10"x10 20-30mmHg 10"x10 20-30mmHg   Posture tband grn HEP  15x ea grn 20x ea grn 20x ea grn 25x ea grn 30x ea grn 30x ea grn 30x ea grn 30x ea grn   C/S retraction HEP     rev    5"x10   C/S and T/S ext over chair    10"x10 10"x10 10"x10 10"x10 10"x10  10"x10   Prone YT                                       Ther Ex             Doorway pec str    20"x3 20"x3 30"x3 30"x3 30"x3                                                                                                Ther Activity Bwd UBE      6 min 8 min 8 min 8 min                 Gait Training                                       Modalities             HP supine  7min 7 min 7 min 7 min   7 min 7 min

## 2022-02-10 ENCOUNTER — OFFICE VISIT (OUTPATIENT)
Dept: PHYSICAL THERAPY | Facility: CLINIC | Age: 72
End: 2022-02-10
Payer: COMMERCIAL

## 2022-02-10 DIAGNOSIS — M54.2 CERVICALGIA: Primary | ICD-10-CM

## 2022-02-10 PROCEDURE — 97112 NEUROMUSCULAR REEDUCATION: CPT | Performed by: PHYSICAL THERAPIST

## 2022-02-10 PROCEDURE — 97140 MANUAL THERAPY 1/> REGIONS: CPT | Performed by: PHYSICAL THERAPIST

## 2022-02-10 NOTE — PROGRESS NOTES
Daily Note     Today's date: 2/10/2022  Patient name: Elizabeth Siegel  : 1950  MRN: [id number]  Referring provider: BRISEYDA Watters  Dx:   Encounter Diagnosis     ICD-10-CM    1  Cervicalgia  M54 2                   Subjective: Pt reports she started using a cold pack after last visit and it's helping significantly  Objective: See treatment diary below      Assessment: Tolerated treatment well  Patient with excellent ROM during manuals today  No complaints of pain throughout session  Plan: Continue per plan of care        Precautions: none reported  [facility]    Manuals 2/10     1/7 1/11 1/17 1/26 2/4   C/S traction + retraction + ext 15x 100% 10"x5 100%     75% 15x 75% 10"x5 100% 15x 75% 10"x5 100% 15x 75% 10"x5 100% 15x 100% 10"x5 15x 100%                 Max simon laser L parasp      4' 2 min 13W 2 min 13W 2 min 13W 3 min 13W   SOR KT     KT KT KT KT KT   Neuro Re-Ed             DNF w/ biofeedback 10"x10 20-30mmHg     10"x10 20-30mmHg 10"x10 20-30mmHg 10"x10 20-30mmHg 10"x10 20-30mmHg 10"x10 20-30mmHg   Posture tband 30x ea blue     25x ea grn 30x ea grn 30x ea grn 30x ea grn 30x ea grn   C/S retraction 5"x10     rev    5"x10   C/S and T/S ext over chair 10"x10     10"x10 10"x10 10"x10  10"x10   Prone YT                                       Ther Ex             Doorway pec str      30"x3 30"x3 30"x3                                                                                                Ther Activity             Bwd UBE 8 min     6 min 8 min 8 min 8 min                 Gait Training                                       Modalities             HP supine        7 min 7 min

## 2022-02-15 ENCOUNTER — OFFICE VISIT (OUTPATIENT)
Dept: PHYSICAL THERAPY | Facility: CLINIC | Age: 72
End: 2022-02-15
Payer: COMMERCIAL

## 2022-02-15 DIAGNOSIS — M54.2 CERVICALGIA: Primary | ICD-10-CM

## 2022-02-15 PROCEDURE — 97140 MANUAL THERAPY 1/> REGIONS: CPT | Performed by: PHYSICAL THERAPIST

## 2022-02-15 PROCEDURE — 97530 THERAPEUTIC ACTIVITIES: CPT | Performed by: PHYSICAL THERAPIST

## 2022-02-15 PROCEDURE — 97112 NEUROMUSCULAR REEDUCATION: CPT | Performed by: PHYSICAL THERAPIST

## 2022-02-15 NOTE — PROGRESS NOTES
Daily Note     Today's date: 2/15/2022  Patient name: Maryana Reynolds  : 1950  MRN: [id number]  Referring provider: BRISEYDA Nick  Dx:   Encounter Diagnosis     ICD-10-CM    1  Cervicalgia  M54 2                   Subjective: Pt reports she continues to feel better with increased movement and less pain  She feels the cold pack really helps  Objective: See treatment diary below      Assessment: Tolerated treatment well   Patient exhibited good technique with therapeutic exercises      Plan: Follow up in appx 2 weeks     Precautions: none reported  [facility]    Manuals 2/10 2/15    1/7 1/11 1/17 1/26 2/4   C/S traction + retraction + ext 15x 100% 10"x5 100% 15x 100% 10"x5 100%     75% 15x 75% 10"x5 100% 15x 75% 10"x5 100% 15x 75% 10"x5 100% 15x 100% 10"x5 15x 100%                 Max simon laser L parasp      4' 2 min 13W 2 min 13W 2 min 13W 3 min 13W   SOR KT KT    KT KT KT KT KT   Neuro Re-Ed             DNF w/ biofeedback 10"x10 20-30mmHg 10"x10 20-30mmHg    10"x10 20-30mmHg 10"x10 20-30mmHg 10"x10 20-30mmHg 10"x10 20-30mmHg 10"x10 20-30mmHg   Posture tband 30x ea blue 30x ea blue    25x ea grn 30x ea grn 30x ea grn 30x ea grn 30x ea grn   C/S retraction 5"x10 5"x10    rev    5"x10   C/S and T/S ext over chair 10"x10 10"x10    10"x10 10"x10 10"x10  10"x10   Prone YT                                       Ther Ex             Doorway pec str      30"x3 30"x3 30"x3                                                                                                Ther Activity             Bwd UBE 8 min 8 min    6 min 8 min 8 min 8 min                 Gait Training                                       Modalities             HP supine        7 min 7 min

## 2022-02-17 ENCOUNTER — APPOINTMENT (OUTPATIENT)
Dept: PHYSICAL THERAPY | Facility: CLINIC | Age: 72
End: 2022-02-17
Payer: COMMERCIAL

## 2022-02-28 ENCOUNTER — OFFICE VISIT (OUTPATIENT)
Dept: PHYSICAL THERAPY | Facility: CLINIC | Age: 72
End: 2022-02-28
Payer: COMMERCIAL

## 2022-02-28 DIAGNOSIS — M54.2 CERVICALGIA: Primary | ICD-10-CM

## 2022-02-28 PROCEDURE — 97530 THERAPEUTIC ACTIVITIES: CPT | Performed by: PHYSICAL THERAPIST

## 2022-02-28 PROCEDURE — 97140 MANUAL THERAPY 1/> REGIONS: CPT | Performed by: PHYSICAL THERAPIST

## 2022-02-28 PROCEDURE — 97112 NEUROMUSCULAR REEDUCATION: CPT | Performed by: PHYSICAL THERAPIST

## 2022-02-28 NOTE — PROGRESS NOTES
Daily Note/Discharge     Today's date: 2022  Patient name: Jacquelin Corley  : 1950  MRN: [id number]  Referring provider: BRISEYDA Nelson  Dx:   Encounter Diagnosis     ICD-10-CM    1  Cervicalgia  M54 2                   Subjective: Pt reports she's feeling well with two week trial hold of PT  She is independent with her HEP and happy to be discharged at this time  Objective: See treatment diary below      Assessment: Tolerated treatment well  Patient exhibited good technique with therapeutic exercises  Reviewed when and how to reach out for follow up services        Plan: Discharge to [facility]     Precautions: none reported  [facility]    Manuals 2/10 2/15 2/28   1/7 1/11 1/17 1/26 2/4   C/S traction + retraction + ext 15x 100% 10"x5 100% 15x 100% 10"x5 100%  15x 100% 10"X5 100%   75% 15x 75% 10"x5 100% 15x 75% 10"x5 100% 15x 75% 10"x5 100% 15x 100% 10"x5 15x 100%                 Max simon laser L parasp      4' 2 min 13W 2 min 13W 2 min 13W 3 min 13W   SOR KT KT KT   KT KT KT KT KT   Neuro Re-Ed             DNF w/ biofeedback 10"x10 20-30mmHg 10"x10 20-30mmHg 10"x10 20-30mmHg   10"x10 20-30mmHg 10"x10 20-30mmHg 10"x10 20-30mmHg 10"x10 20-30mmHg 10"x10 20-30mmHg   Posture tband 30x ea blue 30x ea blue 30x ea blue   25x ea grn 30x ea grn 30x ea grn 30x ea grn 30x ea grn   C/S retraction 5"x10 5"x10    rev    5"x10   C/S and T/S ext over chair 10"x10 10"x10    10"x10 10"x10 10"x10  10"x10   Prone YT                                       Ther Ex             Doorway pec str      30"x3 30"x3 30"x3                                                                                                Ther Activity             Bwd UBE 8 min 8 min 8 min   6 min 8 min 8 min 8 min                 Gait Training                                       Modalities             HP supine        7 min 7 min

## 2023-05-01 DIAGNOSIS — Z12.12 SCREENING FOR COLORECTAL CANCER: Primary | ICD-10-CM

## 2023-05-01 DIAGNOSIS — Z12.11 SCREENING FOR COLORECTAL CANCER: Primary | ICD-10-CM

## 2024-01-30 ENCOUNTER — TELEPHONE (OUTPATIENT)
Dept: FAMILY MEDICINE CLINIC | Facility: CLINIC | Age: 74
End: 2024-01-30

## 2024-01-31 NOTE — TELEPHONE ENCOUNTER
01/31/24 1:22 PM        The office's request has been received, reviewed, and the patient chart updated. The PCP has successfully been removed with a patient attribution note. This message will now be completed.        Thank you  Jane Yarbrough